# Patient Record
Sex: FEMALE | Race: WHITE | NOT HISPANIC OR LATINO | Employment: FULL TIME | URBAN - METROPOLITAN AREA
[De-identification: names, ages, dates, MRNs, and addresses within clinical notes are randomized per-mention and may not be internally consistent; named-entity substitution may affect disease eponyms.]

---

## 2020-05-05 DIAGNOSIS — Z01.84 ANTIBODY RESPONSE EXAMINATION: ICD-10-CM

## 2021-05-04 ENCOUNTER — PATIENT MESSAGE (OUTPATIENT)
Dept: RESEARCH | Facility: HOSPITAL | Age: 61
End: 2021-05-04

## 2022-01-14 ENCOUNTER — TELEPHONE (OUTPATIENT)
Dept: PHARMACY | Facility: CLINIC | Age: 62
End: 2022-01-14

## 2022-01-14 NOTE — TELEPHONE ENCOUNTER
Incoming call from patient stating RX for [facility] was sent to [facility], and they are getting a rejection stating patient is locked in to [facility]. Checked with fulfillment team and we have never filled this before. Will need to check supplier to see if this is something we can order. Fulfillment team will check this and f/u with clinical team within the next 30 minutes.     Patient agrees to a call back to discuss whether RX should be transferred to [facility] for work up and dispense, or if patient must use a different pharmacy. Escalated to supervisor for f/u.

## 2022-01-31 ENCOUNTER — TELEPHONE (OUTPATIENT)
Dept: PHARMACY | Facility: CLINIC | Age: 62
End: 2022-01-31

## 2022-01-31 NOTE — TELEPHONE ENCOUNTER
Spoke to patient regarding [employer] rx. Informed patient I spoke to  and provider must fill out paperwork online at [employer] website in order for patient to receive the medication. Patient voiced understanding. This is a LDD.

## 2023-04-13 PROBLEM — R00.2 PALPITATIONS: Status: ACTIVE | Noted: 2023-04-13

## 2023-04-13 PROBLEM — I10 HTN (HYPERTENSION): Status: ACTIVE | Noted: 2023-04-13

## 2023-06-13 PROBLEM — I70.0 CALCIFICATION OF ABDOMINAL AORTA: Status: ACTIVE | Noted: 2023-06-13
